# Patient Record
Sex: MALE | Race: WHITE | URBAN - METROPOLITAN AREA
[De-identification: names, ages, dates, MRNs, and addresses within clinical notes are randomized per-mention and may not be internally consistent; named-entity substitution may affect disease eponyms.]

---

## 2024-08-24 ENCOUNTER — OFFICE VISIT (OUTPATIENT)
Age: 24
End: 2024-08-24

## 2024-08-24 VITALS
TEMPERATURE: 97.5 F | HEIGHT: 68 IN | HEART RATE: 90 BPM | WEIGHT: 260 LBS | DIASTOLIC BLOOD PRESSURE: 88 MMHG | OXYGEN SATURATION: 99 % | SYSTOLIC BLOOD PRESSURE: 126 MMHG | BODY MASS INDEX: 39.4 KG/M2

## 2024-08-24 DIAGNOSIS — R03.0 ELEVATED BLOOD PRESSURE READING IN OFFICE WITHOUT DIAGNOSIS OF HYPERTENSION: ICD-10-CM

## 2024-08-24 DIAGNOSIS — U07.1 COVID-19: Primary | ICD-10-CM

## 2024-08-24 DIAGNOSIS — R51.9 INTRACTABLE HEADACHE, UNSPECIFIED CHRONICITY PATTERN, UNSPECIFIED HEADACHE TYPE: ICD-10-CM

## 2024-08-24 DIAGNOSIS — J02.9 SORE THROAT: ICD-10-CM

## 2024-08-24 LAB
Lab: ABNORMAL
PERFORMING INSTRUMENT: ABNORMAL
QC PASS/FAIL: ABNORMAL
SARS-COV-2, POC: DETECTED

## 2024-08-24 RX ORDER — TOPIRAMATE 25 MG/1
25 TABLET, FILM COATED ORAL 2 TIMES DAILY
COMMUNITY
Start: 2023-10-15

## 2024-08-24 RX ORDER — SERTRALINE HYDROCHLORIDE 100 MG/1
200 TABLET, FILM COATED ORAL DAILY
COMMUNITY
Start: 2024-07-15

## 2024-08-24 RX ORDER — ERGOCALCIFEROL 1.25 MG/1
CAPSULE, LIQUID FILLED ORAL
COMMUNITY
Start: 2024-07-15

## 2024-08-24 NOTE — PROGRESS NOTES
Vance Moran (: 2000) is a 24 y.o. male, New patient, here for evaluation of the following chief complaint(s):  Cough, Pharyngitis (Pt states throat is sore and dry/X 3 days), Headache, and Nasal Congestion      ASSESSMENT/PLAN:    ICD-10-CM    1. COVID-19  U07.1       2. Sore throat  J02.9 POCT COVID-19, Antigen      3. Intractable headache, unspecified chronicity pattern, unspecified headache type  R51.9 POCT COVID-19, Antigen      4. Elevated blood pressure reading in office without diagnosis of hypertension  R03.0           COVID:  In-clinic COVID test POSITIVE  Low concern for pneumonia, strep pharyngitis, bronchitis, or respiratory distress at this time.  Provided currently recommended CDC guidelines regarding quarantine  Discussed Paxlovid antiviral treatment, patient declined  Recommended OTC medications and home remedy treatments for symptomatic relief  Discussed strict ED follow up guidelines should symptoms worsen.  Discussed risks associated with taking OTC medications such as Sudafed as they can raise blood pressure    Discussed PCP follow up for persisting or worsening symptoms, or to return to the clinic if unable to obtain PCP follow up for worsening symptoms.    The patient tolerated their visit well. The patient and/or the family were informed of the results of any tests, a time was given to answer questions, a plan was proposed and they agreed with plan. Reviewed AVS with treatment instructions and answered questions - pt/family expresses understanding and agreement with the discussed treatment plan and AVS instructions.      SUBJECTIVE/OBJECTIVE:  HPI:   24 y.o. male presents for complaint of postnasal drainage, headache, runny nose, nasal congestion and headaches  x 2 days.    Admits had sore throat initially   Denies     DayQuil and rest makes symptoms better.  Nothing makes symptoms worse.    Has taken  DayQUil  for symptoms with some benefit        History provided by:   Mood normal.         Behavior: Behavior is cooperative.       PROCEDURES:  Unless otherwise noted below, none     Procedures    RESULTS:  No results found for this visit on 08/24/24.  An electronic signature was used to authenticate this note.    --JOSEF Gonzalez - CNP

## 2024-08-24 NOTE — PATIENT INSTRUCTIONS
COVID-19  Your in clinic Covid-19 test was positive  Your in clinic strep test was negative  Treat symptoms with OTC remedies  Wear a mask in public  Avoid contact with people until fever free for 24 hours  Follow CDC guidelines regarding isolation  Recommend OTC treatment for symptoms:  ibuprofen (Advil, Motrin) and acetaminophen (Tylenol) for fevers and pain relief.  Antihistamines (Claritin, Zyrtec, Allegra, or Xyzal) and nasal steroid sprays (such as Flonase) to help with nasal congestion and runny nose.  Avoid nasal decongestants such as pseudoephedrine as they can elevate your blood pressure  Saline Mist Sprays such as Arm & Hammer Simply Saline to loosen and clear secretions  throat sprays (Cepacol, chloraseptic) for throat pain.  throat lozenges, and increased water intake for cough.  honey (1-2 teaspoons every hour) for relief of throat irritation and coughing fits.  warm teas, humidifiers, nasal lavages, and sleeping in an inclined position are also helpful options that can lessen symptoms.

## 2025-03-05 ENCOUNTER — OFFICE VISIT (OUTPATIENT)
Age: 25
End: 2025-03-05

## 2025-03-05 VITALS
HEART RATE: 90 BPM | OXYGEN SATURATION: 96 % | BODY MASS INDEX: 39.4 KG/M2 | HEIGHT: 68 IN | WEIGHT: 260 LBS | TEMPERATURE: 98.3 F | SYSTOLIC BLOOD PRESSURE: 116 MMHG | DIASTOLIC BLOOD PRESSURE: 76 MMHG

## 2025-03-05 DIAGNOSIS — J01.90 ACUTE SINUSITIS, RECURRENCE NOT SPECIFIED, UNSPECIFIED LOCATION: Primary | ICD-10-CM

## 2025-03-05 RX ORDER — DEXTROMETHORPHAN HYDROBROMIDE AND PROMETHAZINE HYDROCHLORIDE 15; 6.25 MG/5ML; MG/5ML
5 SYRUP ORAL 4 TIMES DAILY PRN
Qty: 100 ML | Refills: 0 | Status: SHIPPED | OUTPATIENT
Start: 2025-03-05 | End: 2025-03-10

## 2025-03-05 RX ORDER — AZITHROMYCIN 250 MG/1
TABLET, FILM COATED ORAL
Qty: 6 TABLET | Refills: 0 | Status: SHIPPED | OUTPATIENT
Start: 2025-03-05

## 2025-03-05 RX ORDER — PREDNISONE 20 MG/1
40 TABLET ORAL DAILY
Qty: 10 TABLET | Refills: 0 | Status: SHIPPED | OUTPATIENT
Start: 2025-03-05 | End: 2025-03-10

## 2025-03-05 NOTE — PROGRESS NOTES
Vance Moran (:  2000) is a 24 y.o. male,  here for evaluation of the following chief complaint(s): Pharyngitis (Started a couple days ago) and Cough    Vance Moran is a: Established patient.   Last Urgent Care Visit: 2024  I have reviewed the patient's medications and basic medical history; see Medication Reconciliation.    ASSESSMENT/PLAN:  Diagnosis:     ICD-10-CM    1. Acute sinusitis, recurrence not specified, unspecified location  J01.90 predniSONE (DELTASONE) 20 MG tablet     azithromycin (ZITHROMAX) 250 MG tablet     promethazine-dextromethorphan (PROMETHAZINE-DM) 6.25-15 MG/5ML syrup             Medical Decision Making:   Patient was seen at Urgent Care today for Cough; sinus/nasal congestion; sore throat: x 3 day(s)     On presentation, pt appears well. They are afrebile, not hypoxic or in any respiratory distress.   Lungs clear on auscultation. Remainder of exam is largely benign without significant concerning findings.     Patient will be treated for Sinusitis    Prescription(s) provided: Zpak;, Prednisone;, and Promethazine-DM;    Patient was discharged home with follow-up and return precautions.    Patient's initial blood pressure was elevated greater than 130/80. BP was rechecked prior to discharge and is below 130/80. Therefore, referral to PCP is not required at this time.       Results:  No results found for any visits on 25.       SUBJECTIVE/OBJECTIVE:  HPI    This is a 24 y.o. male that presents today with complaint of: Cough; sinus/nasal congestion; sinus pain and pressure; sore throat:   Symptoms have been ongoing x 3 day(s).    Multiple other people in his household including roommate have had similar symptoms since last week. At least one person had fever.     Pt reports he started feeling ill Monday (3 days).   He has had mild to moderate sore throat with worsening nasal and sinus congestion.   Mild non-productive cough only.   No fever.   Denies SOB.   No ear pain. 
Home
No